# Patient Record
Sex: FEMALE | Race: WHITE
[De-identification: names, ages, dates, MRNs, and addresses within clinical notes are randomized per-mention and may not be internally consistent; named-entity substitution may affect disease eponyms.]

---

## 2019-11-19 ENCOUNTER — HOSPITAL ENCOUNTER (OUTPATIENT)
Dept: HOSPITAL 92 - BICCT | Age: 84
Discharge: HOME | End: 2019-11-19
Attending: INTERNAL MEDICINE
Payer: MEDICARE

## 2019-11-19 DIAGNOSIS — R91.8: ICD-10-CM

## 2019-11-19 DIAGNOSIS — J45.909: Primary | ICD-10-CM

## 2019-11-19 PROCEDURE — 71250 CT THORAX DX C-: CPT

## 2019-11-19 NOTE — CT
CT CHEST WITHOUT CONTRAST:

 

Date:  11/19/19 

 

HISTORY:  Asthma. 

 

FINDINGS:

Absence of IV contrast reduces the sensitivity of exam, particularly for evaluation of mediastinal, h
ilar, and vascular structures. 

 

There are calcified lymph nodes in the mediastinum and hilar regions. There are vascular calcificatio
ns without evidence of aneurysmal dilatation of the thoracic aorta. No pleural or pericardial effusio
ns are seen. The tracheobronchial tree is patent. There is mild infiltrate versus atelectatic change 
in the lateral segment of the lingula. There is a 4 mm solid-appearing parenchymal nodule at the righ
t lung base. No pneumothoraces or focal areas of consolidation seen. 

 

There are 3 mm nodules in the right upper lobe. 

 

There is a 3-4 mm peripheral nodule in the left lower lobe. A calcified granuloma is seen in the left
 lower lobe. 

 

Upper abdominal tomograms demonstrate calcified granulomas in the liver and spleen. 

 

IMPRESSION: 

Tiny lung nodules. 

 

RECOMMENDATION:

A follow-up CT scan is recommended in 6 months. 

 

POS: OFF